# Patient Record
Sex: FEMALE | Race: BLACK OR AFRICAN AMERICAN | NOT HISPANIC OR LATINO | Employment: PART TIME | URBAN - METROPOLITAN AREA
[De-identification: names, ages, dates, MRNs, and addresses within clinical notes are randomized per-mention and may not be internally consistent; named-entity substitution may affect disease eponyms.]

---

## 2021-11-11 ENCOUNTER — HISTORICAL (OUTPATIENT)
Dept: RADIOLOGY | Facility: HOSPITAL | Age: 46
End: 2021-11-11

## 2022-01-01 ENCOUNTER — CLINICAL SUPPORT (OUTPATIENT)
Dept: BARIATRICS | Facility: HOSPITAL | Age: 47
End: 2022-01-01
Payer: COMMERCIAL

## 2022-01-01 ENCOUNTER — HOSPITAL ENCOUNTER (OUTPATIENT)
Dept: RADIOLOGY | Facility: HOSPITAL | Age: 47
Discharge: HOME | End: 2022-12-08
Attending: OBSTETRICS & GYNECOLOGY
Payer: COMMERCIAL

## 2022-01-01 VITALS — WEIGHT: 283.38 LBS | HEIGHT: 68 IN | BODY MASS INDEX: 42.95 KG/M2

## 2022-01-01 DIAGNOSIS — Z12.31 SCREENING MAMMOGRAM FOR BREAST CANCER: Primary | ICD-10-CM

## 2022-01-01 DIAGNOSIS — Z12.31 SCREENING MAMMOGRAM FOR BREAST CANCER: ICD-10-CM

## 2022-01-01 PROCEDURE — 77067 SCR MAMMO BI INCL CAD: CPT | Mod: TC

## 2022-01-01 PROCEDURE — 77063 BREAST TOMOSYNTHESIS BI: CPT | Mod: 26,,, | Performed by: RADIOLOGY

## 2022-01-01 PROCEDURE — 77063 MAMMO DIGITAL SCREENING BILAT WITH TOMO: ICD-10-PCS | Mod: 26,,, | Performed by: RADIOLOGY

## 2022-01-01 PROCEDURE — 77067 SCR MAMMO BI INCL CAD: CPT | Mod: 26,,, | Performed by: RADIOLOGY

## 2022-01-01 PROCEDURE — 77067 MAMMO DIGITAL SCREENING BILAT WITH TOMO: ICD-10-PCS | Mod: 26,,, | Performed by: RADIOLOGY

## 2022-11-03 NOTE — PROGRESS NOTES
BEHAVIOR MODIFICATION AND EXERCISE CONSULT    Non Surgical: [] Surgical: [x]      PERSONAL:     What initiated your interest in bariatric surgery? Struggled with my weight since I was 6 y.o.    Marital Status: []Single   [x]   []   []      Do you have children? 2    What is your highest level of education completed? 1.5 year of college    Who is your social or relational support? Daughter, spouse, dad.    Do you work? 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    Do you enjoy your work? Yes    PHYSICAL ACTIVITY:    Do you currently exercise: []Yes   [x]No    If so, please describe:    Have you experienced any injuries and/or restrictions that may limit your physical activity? []Yes   [x]No      If so, please describe:     How do you feel about exercise? I always did it all my life. I like walking on a track.    BEHAVIORS:    What behavior(s) would you like to change in order to be healthy? No sweets, late night eating, eat healthier and more often.    On a scale of 1-10 (1-extremely low, 10-extremely high), how motivated are you to change your behavior(s)? 10    Do you currently use any type of tobacco products (vape, dip, cigarettes, etc.) occasional cigar smoker.    If yes, on average, how many and/or how often do you use these products on a daily basis? 1-2x/month    How many hours of sleep do you average? 7-8    Rate your stress level on a scale of 1-10 (1-extremely low, 10-extremely high) 7    What is your biggest stressor? Daily life stuff    Is your appetite affected by stress? No    How do you cope and/or manage stress? Sleep, read    NOTES: A body composition was conducted and patient was educated. Measurements were conducted. Waist/hip ratio is 50.5/52 inches. Handouts: stress management.    Emeli David, [place], CPT, CHC

## 2022-11-03 NOTE — PROGRESS NOTES
NUTRITIONAL CONSULT    Initial assessment for sleeve gastrectomy   Non Surgical: []      PAST HISTORY:   Dieting attempts include Pt has tried lwo carb and diet pills and shots that have worked but then when she stops it all comes back, just is not consistent.   Highest weight: 321lb    CLINICAL DATA:  Height: 68in  Weight: 283.4lb lbs  IBW: 140 lbs  BMI: 43.09  Bariatric goal weight (125% EBW): 175 lbs  Patient's goal weight: 180 lbs    FAMILY HISTORY OF OBESITY:   []Yes    [x]No            WHAT SIDE OF THE FAMILY?   []Mother   []Father   []Sibling   []Child     []Extended    []Adopted       Goal for Bariatric Surgery: to improve health, to improve quality of life, to lose weight, and to prevent future medical conditions    NUTRITION & HEALTH HISTORY:  Greatest challenge: dining out frequency, starchy CHO, portion control, and high-fat diet    Current diet recall:   6am: not eating   10am: ritz crackers   12pm: mustard green rice and gravy and chicken   2pm: popcorn   830: [place] bag chips   Dark chocolate - most day of the week   Water is all she drink 100oz       Current Dietary Patterns:  Meal pattern: 3  Snackin / day Type:   Vegetables: Likes a variety. Eats daily.  Fruits: Likes a variety. Eats almost daily.  Beverages: water  Alcohol consumption:  on the weekends   Type:  Gin- with fruit juice   Dining out: Monthly. Mostly fast food, restaurants, and take-out.  Grocery shopping and food prep: [x]Self   []Spouse   []Other:   Emotional eating: []Yes   [x]No Which emotions if yes:  Nighttime snacking: []Yes   [x]No Middle of night: []Yes   []No  Before bedtime: [x]Yes   []No chocolate   Hx of disordered eating behaviors: []Yes   []No Anorexia: []Yes   []No Bulimia: []Yes   []No  Purging: []Yes   []No Binging: []Yes   []No  History of vitamin/mineral deficiencies:   [x]Yes   []No  Vit D  Vitamins / Minerals / Herbs:   Vit d, MVI. Vit c,     Food Allergies:   None       ASSESSMENT:  Patient reports attempts  at weight loss, only to regain lost weight.  Patient demonstrated knowledge of healthy eating behaviors and exercise patterns; admits to not eating healthy and not exercising at this point.  Patient states willingness to change lifestyle and make behavior modifications.  Expect good  compliance after surgery at this time.        ESTIMATED NEEDS:  Calories: 5415-1947  (20-25 kcal/kg adjusted BW/d)  Protein:  80-88  (1.0-1.1 g/kg adjusted BW/d)  Fluid:   1600  (20 mL/kg actual BW/d)    BARIATRIC DIET DISCUSSION:  Discussed diet after surgery and related to patients food record.  Reviewed diet progression before and after surgery.  Reinforced that surgery is not a magic bullet and importance of low fat foods and no snacking.  Answered all questions.    RECOMMENDATIONS:  Patient is a good candidate for bariatric surgery.      PLAN:  Continue to review Bariatric Nutrition Guidebook at home and call with any questions.  Work on Bariatric Nutrition Checklist.  1500-calorie diet.  5-6 meals per day.  Increase exercise.

## 2022-12-08 PROBLEM — E66.01 MORBID OBESITY WITH BMI OF 40.0-44.9, ADULT: Chronic | Status: ACTIVE | Noted: 2022-01-01

## 2022-12-08 PROBLEM — A04.8 H. PYLORI INFECTION: Status: ACTIVE | Noted: 2022-01-01

## 2022-12-16 NOTE — H&P
[facility]  General Surgery  History & Physical    Patient Name: Jasmina Ro  MRN: [id number]  Admission Date: 12/8/2022  Attending Physician: No att. providers found   Primary Care Provider: NASIMA HADDAD    Patient information was obtained from patient.     Subjective:     Chief Complaint/Reason for Admission: EGD    History of Present Illness:  Patient is a 47 y.o. female presents to \[facility]\""P for EGD. She is presenting today for EGD. No complaints. Preparing for bariatric surgery.   Previously treated for H Pylori.     Current Facility-Administered Medications on File Prior to Encounter   Medication    acetaminophen tablet 650 mg    labetaloL injection 10 mg    lactated ringers infusion    lactated ringers infusion    ondansetron injection 4 mg     Current Outpatient Medications on File Prior to Encounter   Medication Sig    atorvastatin (LIPITOR) 20 MG tablet Take 20 mg by mouth.    cholecalciferol, vitamin D3, 125 mcg (5,000 unit) Tab Take 5,000 Units by mouth.    glipizide-metformin (METAGLIP) 2.5-250 mg per tablet     hydroCHLOROthiazide (HYDRODIURIL) 25 MG tablet Take 25 mg by mouth.    lisinopriL 10 MG tablet Take 10 mg by mouth.    montelukast (SINGULAIR) 10 mg tablet Take 10 mg by mouth.    mv-ca-mn/iron/folic/K/herb 293 (ALIVE WOMEN'S ENERGY ORAL)     [DISCONTINUED] amLODIPine (NORVASC) 10 MG tablet 1 tablet.       Review of patient's allergies indicates:   Allergen Reactions    Norco [hydrocodone-acetaminophen] Itching       Past Medical History:   Diagnosis Date    ADD (attention deficit disorder)     Anxiety disorder, unspecified     Crohn disease     Depression     Diabetes mellitus     GERD (gastroesophageal reflux disease)     Hiatal hernia     HLD (hyperlipidemia)     Hypertension      Past Surgical History:   Procedure Laterality Date    COLONOSCOPY      ECTOPIC PREGNANCY SURGERY      ESOPHAGOGASTRODUODENOSCOPY      LAPAROSCOPIC  CHOLECYSTECTOMY      LAPAROSCOPIC OOPHORECTOMY      REDUCTION OF BOTH BREASTS      TONSILLECTOMY       Family History    None       Tobacco Use    Smoking status: Former     Types: Cigarettes    Smokeless tobacco: Never   Substance and Sexual Activity    Alcohol use: Yes     Comment: Very little    Drug use: Never    Sexual activity: Not on file     Review of Systems   Constitutional: Negative.    HENT: Negative.     Eyes: Negative.    Respiratory: Negative.     Cardiovascular: Negative.    Gastrointestinal: Negative.    Endocrine: Negative.    Genitourinary: Negative.    Musculoskeletal: Negative.    Skin: Negative.    Neurological: Negative.    Psychiatric/Behavioral: Negative.     Objective:     Vital Signs (Most Recent):    Vital Signs (24h Range):  BP: ()/()   Arterial Line BP: ()/()         There is no height or weight on file to calculate BMI.    Physical Exam  Vitals reviewed.   Constitutional:       General: She is not in acute distress.     Appearance: Normal appearance.   HENT:      Head: Normocephalic.   Cardiovascular:      Rate and Rhythm: Normal rate and regular rhythm.      Pulses: Normal pulses.   Pulmonary:      Effort: Pulmonary effort is normal.      Breath sounds: Normal breath sounds.   Abdominal:      General: Bowel sounds are normal.      Palpations: Abdomen is soft.      Tenderness: There is no abdominal tenderness.   Musculoskeletal:      Cervical back: Neck supple.      Right lower leg: No edema.      Left lower leg: No edema.   Skin:     General: Skin is warm and dry.   Neurological:      Mental Status: She is alert and oriented to person, place, and time.   Psychiatric:         Mood and Affect: Mood normal.         Behavior: Behavior normal.       Significant Labs:  I have reviewed all pertinent lab results within the past 24 hours.    Significant Diagnostics:  I have reviewed all pertinent imaging results/findings within the past 24 hours.    Assessment/Plan:     There are no hospital  problems to display for this patient.    VTE Risk Mitigation (From admission, onward)      None          Impression: Morbid Obesity   Recent H Pylori infection    Plan: EGD with ОЛЬГА test   Dr. Raphael examined patient, discussed recommendations, and obtained informed consent.   Maddie Crespo, ANP  General Surgery  [facility] [place] [facility]

## 2023-01-01 ENCOUNTER — PATIENT MESSAGE (OUTPATIENT)
Dept: ADMINISTRATIVE | Facility: OTHER | Age: 48
End: 2023-01-01
Payer: COMMERCIAL

## 2023-01-01 ENCOUNTER — CLINICAL SUPPORT (OUTPATIENT)
Dept: BARIATRICS | Facility: HOSPITAL | Age: 48
End: 2023-01-01

## 2023-01-01 ENCOUNTER — HOSPITAL ENCOUNTER (INPATIENT)
Facility: HOSPITAL | Age: 48
LOS: 1 days | Discharge: HOME | DRG: 620 | End: 2023-05-23
Attending: SURGERY | Admitting: SURGERY
Payer: COMMERCIAL

## 2023-01-01 ENCOUNTER — ANESTHESIA (OUTPATIENT)
Dept: SURGERY | Facility: HOSPITAL | Age: 48
DRG: 620 | End: 2023-01-01
Payer: COMMERCIAL

## 2023-01-01 ENCOUNTER — TELEPHONE (OUTPATIENT)
Dept: BARIATRICS | Facility: HOSPITAL | Age: 48
End: 2023-01-01
Payer: COMMERCIAL

## 2023-01-01 ENCOUNTER — ANESTHESIA EVENT (OUTPATIENT)
Dept: SURGERY | Facility: HOSPITAL | Age: 48
DRG: 620 | End: 2023-01-01
Payer: COMMERCIAL

## 2023-01-01 ENCOUNTER — TELEPHONE (OUTPATIENT)
Dept: SURGERY | Facility: CLINIC | Age: 48
End: 2023-01-01
Payer: COMMERCIAL

## 2023-01-01 ENCOUNTER — PATIENT OUTREACH (OUTPATIENT)
Dept: ADMINISTRATIVE | Facility: CLINIC | Age: 48
End: 2023-01-01
Payer: COMMERCIAL

## 2023-01-01 ENCOUNTER — PATIENT MESSAGE (OUTPATIENT)
Dept: ADMINISTRATIVE | Facility: CLINIC | Age: 48
End: 2023-01-01
Payer: COMMERCIAL

## 2023-01-01 VITALS
DIASTOLIC BLOOD PRESSURE: 82 MMHG | RESPIRATION RATE: 20 BRPM | HEIGHT: 69 IN | HEART RATE: 77 BPM | BODY MASS INDEX: 41.77 KG/M2 | SYSTOLIC BLOOD PRESSURE: 139 MMHG | WEIGHT: 282 LBS | TEMPERATURE: 100 F | OXYGEN SATURATION: 100 %

## 2023-01-01 VITALS — BODY MASS INDEX: 38.45 KG/M2 | WEIGHT: 256.63 LBS

## 2023-01-01 VITALS — BODY MASS INDEX: 43.35 KG/M2 | HEIGHT: 68 IN | WEIGHT: 286 LBS

## 2023-01-01 VITALS — BODY MASS INDEX: 39.4 KG/M2 | HEIGHT: 69 IN | WEIGHT: 266 LBS

## 2023-01-01 VITALS — WEIGHT: 282.13 LBS | BODY MASS INDEX: 42.76 KG/M2 | HEIGHT: 68 IN

## 2023-01-01 DIAGNOSIS — E66.01 MORBID OBESITY: ICD-10-CM

## 2023-01-01 DIAGNOSIS — Z13.0 SCREENING, ANEMIA, DEFICIENCY, IRON: ICD-10-CM

## 2023-01-01 DIAGNOSIS — Z91.89 ELECTROLYTE IMBALANCE RISK: ICD-10-CM

## 2023-01-01 DIAGNOSIS — Z13.21 ENCOUNTER FOR VITAMIN DEFICIENCY SCREENING: Primary | ICD-10-CM

## 2023-01-01 LAB
ABORH RETYPE: NORMAL
ANION GAP SERPL CALC-SCNC: 7 MEQ/L
B-HCG UR QL: NEGATIVE
BASOPHILS # BLD AUTO: 0.01 X10(3)/MCL
BASOPHILS NFR BLD AUTO: 0.1 %
BUN SERPL-MCNC: 5.4 MG/DL (ref 7–18.7)
CALCIUM SERPL-MCNC: 8.2 MG/DL (ref 8.4–10.2)
CHLORIDE SERPL-SCNC: 109 MMOL/L (ref 98–107)
CO2 SERPL-SCNC: 20 MMOL/L (ref 22–29)
CREAT SERPL-MCNC: 0.61 MG/DL (ref 0.55–1.02)
CREAT/UREA NIT SERPL: 9
CTP QC/QA: YES
EOSINOPHIL # BLD AUTO: 0.01 X10(3)/MCL (ref 0–0.9)
EOSINOPHIL NFR BLD AUTO: 0.1 %
ERYTHROCYTE [DISTWIDTH] IN BLOOD BY AUTOMATED COUNT: 13.1 % (ref 11.5–17)
GFR SERPLBLD CREATININE-BSD FMLA CKD-EPI: >60 MLS/MIN/1.73/M2
GLUCOSE SERPL-MCNC: 131 MG/DL (ref 74–100)
GROUP & RH: NORMAL
HCT VFR BLD AUTO: 35.5 % (ref 37–47)
HGB BLD-MCNC: 12 G/DL (ref 12–16)
IMM GRANULOCYTES # BLD AUTO: 0.03 X10(3)/MCL (ref 0–0.04)
IMM GRANULOCYTES NFR BLD AUTO: 0.3 %
INDIRECT COOMBS GEL: NORMAL
LYMPHOCYTES # BLD AUTO: 2.2 X10(3)/MCL (ref 0.6–4.6)
LYMPHOCYTES NFR BLD AUTO: 23.1 %
MCH RBC QN AUTO: 29.7 PG (ref 27–31)
MCHC RBC AUTO-ENTMCNC: 33.8 G/DL (ref 33–36)
MCV RBC AUTO: 87.9 FL (ref 80–94)
MONOCYTES # BLD AUTO: 0.7 X10(3)/MCL (ref 0.1–1.3)
MONOCYTES NFR BLD AUTO: 7.4 %
NEUTROPHILS # BLD AUTO: 6.57 X10(3)/MCL (ref 2.1–9.2)
NEUTROPHILS NFR BLD AUTO: 69 %
NRBC BLD AUTO-RTO: 0 %
PLATELET # BLD AUTO: 351 X10(3)/MCL (ref 130–400)
PMV BLD AUTO: 8.7 FL (ref 7.4–10.4)
POCT GLUCOSE: 110 MG/DL (ref 70–110)
POCT GLUCOSE: 117 MG/DL (ref 70–110)
POCT GLUCOSE: 149 MG/DL (ref 70–110)
POCT GLUCOSE: 153 MG/DL (ref 70–110)
POTASSIUM SERPL-SCNC: 3.7 MMOL/L (ref 3.5–5.1)
PSYCHE PATHOLOGY RESULT: NORMAL
RBC # BLD AUTO: 4.04 X10(6)/MCL (ref 4.2–5.4)
SODIUM SERPL-SCNC: 136 MMOL/L (ref 136–145)
SPECIMEN OUTDATE: NORMAL
WBC # SPEC AUTO: 9.52 X10(3)/MCL (ref 4.5–11.5)

## 2023-01-01 PROCEDURE — 81025 URINE PREGNANCY TEST: CPT | Performed by: SURGERY

## 2023-01-01 PROCEDURE — C9290 INJ, BUPIVACAINE LIPOSOME: HCPCS | Performed by: SURGERY

## 2023-01-01 PROCEDURE — 25000003 PHARM REV CODE 250: Performed by: NURSE PRACTITIONER

## 2023-01-01 PROCEDURE — 37000009 HC ANESTHESIA EA ADD 15 MINS: Performed by: SURGERY

## 2023-01-01 PROCEDURE — 85025 COMPLETE CBC W/AUTO DIFF WBC: CPT | Performed by: NURSE PRACTITIONER

## 2023-01-01 PROCEDURE — 71000033 HC RECOVERY, INTIAL HOUR: Performed by: SURGERY

## 2023-01-01 PROCEDURE — D9220A PRA ANESTHESIA: ICD-10-PCS | Mod: ANES,,, | Performed by: ANESTHESIOLOGY

## 2023-01-01 PROCEDURE — 63600175 PHARM REV CODE 636 W HCPCS: Performed by: NURSE PRACTITIONER

## 2023-01-01 PROCEDURE — 36000710: Performed by: SURGERY

## 2023-01-01 PROCEDURE — 25000003 PHARM REV CODE 250: Performed by: NURSE ANESTHETIST, CERTIFIED REGISTERED

## 2023-01-01 PROCEDURE — 25000003 PHARM REV CODE 250: Performed by: SURGERY

## 2023-01-01 PROCEDURE — 27201423 OPTIME MED/SURG SUP & DEVICES STERILE SUPPLY: Performed by: SURGERY

## 2023-01-01 PROCEDURE — 63600175 PHARM REV CODE 636 W HCPCS: Performed by: SURGERY

## 2023-01-01 PROCEDURE — D9220A PRA ANESTHESIA: Mod: ANES,,, | Performed by: ANESTHESIOLOGY

## 2023-01-01 PROCEDURE — 71000015 HC POSTOP RECOV 1ST HR: Performed by: SURGERY

## 2023-01-01 PROCEDURE — 63600175 PHARM REV CODE 636 W HCPCS: Performed by: ANESTHESIOLOGY

## 2023-01-01 PROCEDURE — 63600175 PHARM REV CODE 636 W HCPCS

## 2023-01-01 PROCEDURE — 11000001 HC ACUTE MED/SURG PRIVATE ROOM

## 2023-01-01 PROCEDURE — 63600175 PHARM REV CODE 636 W HCPCS: Performed by: NURSE ANESTHETIST, CERTIFIED REGISTERED

## 2023-01-01 PROCEDURE — 80048 BASIC METABOLIC PNL TOTAL CA: CPT | Performed by: NURSE PRACTITIONER

## 2023-01-01 PROCEDURE — 86900 BLOOD TYPING SEROLOGIC ABO: CPT | Performed by: NURSE PRACTITIONER

## 2023-01-01 PROCEDURE — 82962 GLUCOSE BLOOD TEST: CPT | Performed by: SURGERY

## 2023-01-01 PROCEDURE — 99900035 HC TECH TIME PER 15 MIN (STAT)

## 2023-01-01 PROCEDURE — D9220A PRA ANESTHESIA: ICD-10-PCS | Mod: CRNA,,, | Performed by: NURSE ANESTHETIST, CERTIFIED REGISTERED

## 2023-01-01 PROCEDURE — 71000039 HC RECOVERY, EACH ADD'L HOUR: Performed by: SURGERY

## 2023-01-01 PROCEDURE — 36000711: Performed by: SURGERY

## 2023-01-01 PROCEDURE — D9220A PRA ANESTHESIA: Mod: CRNA,,, | Performed by: NURSE ANESTHETIST, CERTIFIED REGISTERED

## 2023-01-01 PROCEDURE — 37000008 HC ANESTHESIA 1ST 15 MINUTES: Performed by: SURGERY

## 2023-01-01 RX ORDER — ONDANSETRON 4 MG/1
4 TABLET, ORALLY DISINTEGRATING ORAL EVERY 6 HOURS PRN
Qty: 20 TABLET | Refills: 0 | Status: SHIPPED | OUTPATIENT
Start: 2023-01-01

## 2023-01-01 RX ORDER — ACETAMINOPHEN 500 MG
1000 TABLET ORAL
Status: COMPLETED | OUTPATIENT
Start: 2023-01-01 | End: 2023-01-01

## 2023-01-01 RX ORDER — PROCHLORPERAZINE EDISYLATE 5 MG/ML
5 INJECTION INTRAMUSCULAR; INTRAVENOUS EVERY 30 MIN PRN
Status: CANCELLED | OUTPATIENT
Start: 2023-01-01

## 2023-01-01 RX ORDER — ONDANSETRON 2 MG/ML
4 INJECTION INTRAMUSCULAR; INTRAVENOUS DAILY PRN
Status: CANCELLED | OUTPATIENT
Start: 2023-01-01

## 2023-01-01 RX ORDER — MEPERIDINE HYDROCHLORIDE 25 MG/ML
25 INJECTION INTRAMUSCULAR; INTRAVENOUS; SUBCUTANEOUS ONCE
Status: COMPLETED | OUTPATIENT
Start: 2023-01-01 | End: 2023-01-01

## 2023-01-01 RX ORDER — ACETAMINOPHEN 10 MG/ML
INJECTION, SOLUTION INTRAVENOUS
Status: COMPLETED
Start: 2023-01-01 | End: 2023-01-01

## 2023-01-01 RX ORDER — HYDROMORPHONE HYDROCHLORIDE 2 MG/ML
0.2 INJECTION, SOLUTION INTRAMUSCULAR; INTRAVENOUS; SUBCUTANEOUS EVERY 5 MIN PRN
Status: CANCELLED | OUTPATIENT
Start: 2023-01-01

## 2023-01-01 RX ORDER — LISINOPRIL 10 MG/1
10 TABLET ORAL DAILY
Status: DISCONTINUED | OUTPATIENT
Start: 2023-01-01 | End: 2023-01-01 | Stop reason: HOSPADM

## 2023-01-01 RX ORDER — BUPIVACAINE HYDROCHLORIDE 5 MG/ML
INJECTION, SOLUTION EPIDURAL; INTRACAUDAL
Status: DISCONTINUED | OUTPATIENT
Start: 2023-01-01 | End: 2023-01-01 | Stop reason: HOSPADM

## 2023-01-01 RX ORDER — FENTANYL CITRATE 50 UG/ML
INJECTION, SOLUTION INTRAMUSCULAR; INTRAVENOUS
Status: DISCONTINUED | OUTPATIENT
Start: 2023-01-01 | End: 2023-01-01

## 2023-01-01 RX ORDER — CEFAZOLIN SODIUM 2 G/50ML
2 SOLUTION INTRAVENOUS
Status: COMPLETED | OUTPATIENT
Start: 2023-01-01 | End: 2023-01-01

## 2023-01-01 RX ORDER — IBUPROFEN 200 MG
16 TABLET ORAL
Status: DISCONTINUED | OUTPATIENT
Start: 2023-01-01 | End: 2023-01-01 | Stop reason: HOSPADM

## 2023-01-01 RX ORDER — ROCURONIUM BROMIDE 10 MG/ML
INJECTION, SOLUTION INTRAVENOUS
Status: DISCONTINUED | OUTPATIENT
Start: 2023-01-01 | End: 2023-01-01

## 2023-01-01 RX ORDER — MEPERIDINE HYDROCHLORIDE 25 MG/ML
12.5 INJECTION INTRAMUSCULAR; INTRAVENOUS; SUBCUTANEOUS EVERY 10 MIN PRN
Status: CANCELLED | OUTPATIENT
Start: 2023-01-01 | End: 2023-01-01

## 2023-01-01 RX ORDER — ONDANSETRON 2 MG/ML
INJECTION INTRAMUSCULAR; INTRAVENOUS
Status: DISCONTINUED | OUTPATIENT
Start: 2023-01-01 | End: 2023-01-01

## 2023-01-01 RX ORDER — HYDROMORPHONE HYDROCHLORIDE 2 MG/ML
0.2 INJECTION, SOLUTION INTRAMUSCULAR; INTRAVENOUS; SUBCUTANEOUS EVERY 5 MIN PRN
Status: DISCONTINUED | OUTPATIENT
Start: 2023-01-01 | End: 2023-01-01 | Stop reason: HOSPADM

## 2023-01-01 RX ORDER — ACETAMINOPHEN 500 MG
1000 TABLET ORAL EVERY 8 HOURS
Qty: 18 TABLET | Refills: 0 | Status: SHIPPED | OUTPATIENT
Start: 2023-01-01 | End: 2023-01-01

## 2023-01-01 RX ORDER — LABETALOL HYDROCHLORIDE 5 MG/ML
10 INJECTION, SOLUTION INTRAVENOUS
Status: DISCONTINUED | OUTPATIENT
Start: 2023-01-01 | End: 2023-01-01 | Stop reason: HOSPADM

## 2023-01-01 RX ORDER — MONTELUKAST SODIUM 10 MG/1
10 TABLET ORAL DAILY
Status: DISCONTINUED | OUTPATIENT
Start: 2023-01-01 | End: 2023-01-01 | Stop reason: HOSPADM

## 2023-01-01 RX ORDER — GLUCAGON 1 MG
1 KIT INJECTION
Status: DISCONTINUED | OUTPATIENT
Start: 2023-01-01 | End: 2023-01-01 | Stop reason: HOSPADM

## 2023-01-01 RX ORDER — ACETAMINOPHEN 10 MG/ML
1000 INJECTION, SOLUTION INTRAVENOUS EVERY 8 HOURS
Status: DISPENSED | OUTPATIENT
Start: 2023-01-01 | End: 2023-01-01

## 2023-01-01 RX ORDER — PROCHLORPERAZINE EDISYLATE 5 MG/ML
5 INJECTION INTRAMUSCULAR; INTRAVENOUS EVERY 6 HOURS PRN
Status: DISCONTINUED | OUTPATIENT
Start: 2023-01-01 | End: 2023-01-01 | Stop reason: HOSPADM

## 2023-01-01 RX ORDER — PANTOPRAZOLE SODIUM 40 MG/1
40 TABLET, DELAYED RELEASE ORAL DAILY
Qty: 30 TABLET | Refills: 0 | Status: SHIPPED | OUTPATIENT
Start: 2023-01-01 | End: 2023-01-01

## 2023-01-01 RX ORDER — GLYCOPYRROLATE 0.2 MG/ML
INJECTION INTRAMUSCULAR; INTRAVENOUS
Status: DISCONTINUED | OUTPATIENT
Start: 2023-01-01 | End: 2023-01-01

## 2023-01-01 RX ORDER — MIDAZOLAM HYDROCHLORIDE 1 MG/ML
INJECTION INTRAMUSCULAR; INTRAVENOUS
Status: DISCONTINUED | OUTPATIENT
Start: 2023-01-01 | End: 2023-01-01

## 2023-01-01 RX ORDER — CELECOXIB 200 MG/1
200 CAPSULE ORAL ONCE
Status: COMPLETED | OUTPATIENT
Start: 2023-01-01 | End: 2023-01-01

## 2023-01-01 RX ORDER — HYDROMORPHONE HYDROCHLORIDE 2 MG/ML
INJECTION, SOLUTION INTRAMUSCULAR; INTRAVENOUS; SUBCUTANEOUS
Status: DISCONTINUED
Start: 2023-01-01 | End: 2023-01-01 | Stop reason: WASHOUT

## 2023-01-01 RX ORDER — ACETAMINOPHEN 500 MG
1000 TABLET ORAL EVERY 8 HOURS
Status: DISCONTINUED | OUTPATIENT
Start: 2023-01-01 | End: 2023-01-01

## 2023-01-01 RX ORDER — ONDANSETRON 2 MG/ML
4 INJECTION INTRAMUSCULAR; INTRAVENOUS EVERY 4 HOURS PRN
Status: DISCONTINUED | OUTPATIENT
Start: 2023-01-01 | End: 2023-01-01 | Stop reason: HOSPADM

## 2023-01-01 RX ORDER — PROMETHAZINE HYDROCHLORIDE 12.5 MG/1
12.5 TABLET ORAL EVERY 6 HOURS PRN
Qty: 20 TABLET | Refills: 0 | Status: SHIPPED | OUTPATIENT
Start: 2023-01-01

## 2023-01-01 RX ORDER — IBUPROFEN 200 MG
24 TABLET ORAL
Status: DISCONTINUED | OUTPATIENT
Start: 2023-01-01 | End: 2023-01-01 | Stop reason: HOSPADM

## 2023-01-01 RX ORDER — HYDRALAZINE HYDROCHLORIDE 20 MG/ML
10 INJECTION INTRAMUSCULAR; INTRAVENOUS EVERY 6 HOURS PRN
Status: DISCONTINUED | OUTPATIENT
Start: 2023-01-01 | End: 2023-01-01 | Stop reason: HOSPADM

## 2023-01-01 RX ORDER — KETOROLAC TROMETHAMINE 10 MG/1
10 TABLET, FILM COATED ORAL EVERY 6 HOURS
Qty: 12 TABLET | Refills: 0 | Status: SHIPPED | OUTPATIENT
Start: 2023-01-01 | End: 2023-01-01

## 2023-01-01 RX ORDER — KETOROLAC TROMETHAMINE 30 MG/ML
30 INJECTION, SOLUTION INTRAMUSCULAR; INTRAVENOUS EVERY 6 HOURS
Status: DISCONTINUED | OUTPATIENT
Start: 2023-01-01 | End: 2023-01-01 | Stop reason: HOSPADM

## 2023-01-01 RX ORDER — DEXAMETHASONE SODIUM PHOSPHATE 4 MG/ML
INJECTION, SOLUTION INTRA-ARTICULAR; INTRALESIONAL; INTRAMUSCULAR; INTRAVENOUS; SOFT TISSUE
Status: DISCONTINUED | OUTPATIENT
Start: 2023-01-01 | End: 2023-01-01

## 2023-01-01 RX ORDER — METHOCARBAMOL 100 MG/ML
INJECTION, SOLUTION INTRAMUSCULAR; INTRAVENOUS
Status: DISPENSED
Start: 2023-01-01 | End: 2023-01-01

## 2023-01-01 RX ORDER — ENOXAPARIN SODIUM 100 MG/ML
40 INJECTION SUBCUTANEOUS DAILY
Qty: 5.6 ML | Refills: 0
Start: 2023-01-01 | End: 2023-01-01

## 2023-01-01 RX ORDER — ENOXAPARIN SODIUM 100 MG/ML
40 INJECTION SUBCUTANEOUS DAILY
Status: DISCONTINUED | OUTPATIENT
Start: 2023-01-01 | End: 2023-01-01 | Stop reason: HOSPADM

## 2023-01-01 RX ORDER — LIDOCAINE HYDROCHLORIDE 10 MG/ML
1 INJECTION, SOLUTION EPIDURAL; INFILTRATION; INTRACAUDAL; PERINEURAL ONCE
Status: CANCELLED | OUTPATIENT
Start: 2023-01-01 | End: 2023-01-01

## 2023-01-01 RX ORDER — PROPOFOL 10 MG/ML
VIAL (ML) INTRAVENOUS
Status: DISCONTINUED | OUTPATIENT
Start: 2023-01-01 | End: 2023-01-01

## 2023-01-01 RX ORDER — MEPERIDINE HYDROCHLORIDE 25 MG/ML
INJECTION INTRAMUSCULAR; INTRAVENOUS; SUBCUTANEOUS
Status: COMPLETED
Start: 2023-01-01 | End: 2023-01-01

## 2023-01-01 RX ORDER — TRAMADOL HYDROCHLORIDE 50 MG/1
100 TABLET ORAL EVERY 6 HOURS PRN
Status: DISCONTINUED | OUTPATIENT
Start: 2023-01-01 | End: 2023-01-01 | Stop reason: HOSPADM

## 2023-01-01 RX ORDER — MIDAZOLAM HYDROCHLORIDE 1 MG/ML
2 INJECTION INTRAMUSCULAR; INTRAVENOUS
Status: CANCELLED | OUTPATIENT
Start: 2023-01-01 | End: 2023-01-01

## 2023-01-01 RX ORDER — ACETAMINOPHEN 500 MG
1000 TABLET ORAL EVERY 8 HOURS
Status: DISCONTINUED | OUTPATIENT
Start: 2023-01-01 | End: 2023-01-01 | Stop reason: HOSPADM

## 2023-01-01 RX ORDER — SODIUM CHLORIDE, SODIUM LACTATE, POTASSIUM CHLORIDE, CALCIUM CHLORIDE 600; 310; 30; 20 MG/100ML; MG/100ML; MG/100ML; MG/100ML
INJECTION, SOLUTION INTRAVENOUS CONTINUOUS
Status: ACTIVE | OUTPATIENT
Start: 2023-01-01 | End: 2023-01-01

## 2023-01-01 RX ORDER — SODIUM CHLORIDE, SODIUM GLUCONATE, SODIUM ACETATE, POTASSIUM CHLORIDE AND MAGNESIUM CHLORIDE 30; 37; 368; 526; 502 MG/100ML; MG/100ML; MG/100ML; MG/100ML; MG/100ML
INJECTION, SOLUTION INTRAVENOUS CONTINUOUS
Status: CANCELLED | OUTPATIENT
Start: 2023-01-01 | End: 2023-01-01

## 2023-01-01 RX ORDER — CLONIDINE 0.1 MG/24H
1 PATCH, EXTENDED RELEASE TRANSDERMAL ONCE AS NEEDED
Status: DISCONTINUED | OUTPATIENT
Start: 2023-01-01 | End: 2023-01-01 | Stop reason: HOSPADM

## 2023-01-01 RX ORDER — ONDANSETRON 4 MG/1
4 TABLET, ORALLY DISINTEGRATING ORAL EVERY 6 HOURS PRN
Status: DISCONTINUED | OUTPATIENT
Start: 2023-01-01 | End: 2023-01-01 | Stop reason: HOSPADM

## 2023-01-01 RX ORDER — SODIUM CHLORIDE, SODIUM LACTATE, POTASSIUM CHLORIDE, CALCIUM CHLORIDE 600; 310; 30; 20 MG/100ML; MG/100ML; MG/100ML; MG/100ML
INJECTION, SOLUTION INTRAVENOUS CONTINUOUS
Status: DISCONTINUED | OUTPATIENT
Start: 2023-01-01 | End: 2023-01-01 | Stop reason: HOSPADM

## 2023-01-01 RX ORDER — PROMETHAZINE HYDROCHLORIDE 12.5 MG/1
12.5 TABLET ORAL EVERY 6 HOURS PRN
Status: DISCONTINUED | OUTPATIENT
Start: 2023-01-01 | End: 2023-01-01 | Stop reason: HOSPADM

## 2023-01-01 RX ORDER — RISANKIZUMAB-RZAA 60 MG/ML
INJECTION INTRAVENOUS
COMMUNITY

## 2023-01-01 RX ORDER — INSULIN ASPART 100 [IU]/ML
1-10 INJECTION, SOLUTION INTRAVENOUS; SUBCUTANEOUS
Status: DISCONTINUED | OUTPATIENT
Start: 2023-01-01 | End: 2023-01-01 | Stop reason: HOSPADM

## 2023-01-01 RX ORDER — ENOXAPARIN SODIUM 100 MG/ML
40 INJECTION SUBCUTANEOUS
Status: COMPLETED | OUTPATIENT
Start: 2023-01-01 | End: 2023-01-01

## 2023-01-01 RX ORDER — DIPHENHYDRAMINE HYDROCHLORIDE 50 MG/ML
25 INJECTION INTRAMUSCULAR; INTRAVENOUS EVERY 6 HOURS PRN
Status: CANCELLED | OUTPATIENT
Start: 2023-01-01

## 2023-01-01 RX ORDER — GABAPENTIN 300 MG/1
600 CAPSULE ORAL ONCE
Status: COMPLETED | OUTPATIENT
Start: 2023-01-01 | End: 2023-01-01

## 2023-01-01 RX ORDER — SODIUM CITRATE AND CITRIC ACID MONOHYDRATE 334; 500 MG/5ML; MG/5ML
30 SOLUTION ORAL ONCE
Status: CANCELLED | OUTPATIENT
Start: 2023-01-01 | End: 2023-01-01

## 2023-01-01 RX ORDER — ONDANSETRON 4 MG/1
4 TABLET, ORALLY DISINTEGRATING ORAL ONCE
Status: COMPLETED | OUTPATIENT
Start: 2023-01-01 | End: 2023-01-01

## 2023-01-01 RX ORDER — SODIUM CHLORIDE 0.9 % (FLUSH) 0.9 %
10 SYRINGE (ML) INJECTION
Status: DISCONTINUED | OUTPATIENT
Start: 2023-01-01 | End: 2023-01-01 | Stop reason: HOSPADM

## 2023-01-01 RX ORDER — PANTOPRAZOLE SODIUM 40 MG/1
40 TABLET, DELAYED RELEASE ORAL DAILY
Status: DISCONTINUED | OUTPATIENT
Start: 2023-01-01 | End: 2023-01-01 | Stop reason: HOSPADM

## 2023-01-01 RX ORDER — LIDOCAINE HYDROCHLORIDE 20 MG/ML
INJECTION, SOLUTION EPIDURAL; INFILTRATION; INTRACAUDAL; PERINEURAL
Status: DISCONTINUED | OUTPATIENT
Start: 2023-01-01 | End: 2023-01-01

## 2023-01-01 RX ADMIN — GLYCOPYRROLATE 0.2 MG: 0.2 INJECTION INTRAMUSCULAR; INTRAVENOUS at 12:05

## 2023-01-01 RX ADMIN — SODIUM CHLORIDE, POTASSIUM CHLORIDE, SODIUM LACTATE AND CALCIUM CHLORIDE: 600; 310; 30; 20 INJECTION, SOLUTION INTRAVENOUS at 12:05

## 2023-01-01 RX ADMIN — SODIUM CHLORIDE, POTASSIUM CHLORIDE, SODIUM LACTATE AND CALCIUM CHLORIDE: 600; 310; 30; 20 INJECTION, SOLUTION INTRAVENOUS at 04:05

## 2023-01-01 RX ADMIN — LIDOCAINE HYDROCHLORIDE 80 MG: 20 INJECTION, SOLUTION EPIDURAL; INFILTRATION; INTRACAUDAL; PERINEURAL at 12:05

## 2023-01-01 RX ADMIN — DEXAMETHASONE SODIUM PHOSPHATE 4 MG: 4 INJECTION, SOLUTION INTRA-ARTICULAR; INTRALESIONAL; INTRAMUSCULAR; INTRAVENOUS; SOFT TISSUE at 12:05

## 2023-01-01 RX ADMIN — ACETAMINOPHEN 1000 MG: 10 INJECTION, SOLUTION INTRAVENOUS at 02:05

## 2023-01-01 RX ADMIN — ACETAMINOPHEN 1000 MG: 500 TABLET, FILM COATED ORAL at 11:05

## 2023-01-01 RX ADMIN — CELECOXIB 200 MG: 200 CAPSULE ORAL at 08:05

## 2023-01-01 RX ADMIN — SUGAMMADEX 200 MG: 100 INJECTION, SOLUTION INTRAVENOUS at 01:05

## 2023-01-01 RX ADMIN — KETOROLAC TROMETHAMINE 30 MG: 30 INJECTION, SOLUTION INTRAMUSCULAR; INTRAVENOUS at 05:05

## 2023-01-01 RX ADMIN — FENTANYL CITRATE 50 MCG: 50 INJECTION, SOLUTION INTRAMUSCULAR; INTRAVENOUS at 01:05

## 2023-01-01 RX ADMIN — LABETALOL HYDROCHLORIDE 10 MG: 5 INJECTION INTRAVENOUS at 09:05

## 2023-01-01 RX ADMIN — LORAZEPAM 0.5 MG: 2 INJECTION INTRAMUSCULAR; INTRAVENOUS at 12:05

## 2023-01-01 RX ADMIN — MEPERIDINE HYDROCHLORIDE 25 MG: 25 INJECTION INTRAMUSCULAR; INTRAVENOUS; SUBCUTANEOUS at 02:05

## 2023-01-01 RX ADMIN — HYDRALAZINE HYDROCHLORIDE 10 MG: 20 INJECTION INTRAMUSCULAR; INTRAVENOUS at 02:05

## 2023-01-01 RX ADMIN — FENTANYL CITRATE 50 MCG: 50 INJECTION, SOLUTION INTRAMUSCULAR; INTRAVENOUS at 12:05

## 2023-01-01 RX ADMIN — CEFAZOLIN SODIUM 2 G: 2 SOLUTION INTRAVENOUS at 12:05

## 2023-01-01 RX ADMIN — ONDANSETRON 4 MG: 4 TABLET, ORALLY DISINTEGRATING ORAL at 09:05

## 2023-01-01 RX ADMIN — ACETAMINOPHEN 1000 MG: 10 INJECTION, SOLUTION INTRAVENOUS at 09:05

## 2023-01-01 RX ADMIN — MIDAZOLAM HYDROCHLORIDE 2 MG: 1 INJECTION, SOLUTION INTRAMUSCULAR; INTRAVENOUS at 12:05

## 2023-01-01 RX ADMIN — ACETAMINOPHEN 1000 MG: 500 TABLET ORAL at 09:05

## 2023-01-01 RX ADMIN — KETOROLAC TROMETHAMINE 30 MG: 30 INJECTION, SOLUTION INTRAMUSCULAR; INTRAVENOUS at 06:05

## 2023-01-01 RX ADMIN — HYDROMORPHONE HYDROCHLORIDE 0.2 MG: 2 INJECTION, SOLUTION INTRAMUSCULAR; INTRAVENOUS; SUBCUTANEOUS at 02:05

## 2023-01-01 RX ADMIN — SODIUM CHLORIDE, SODIUM GLUCONATE, SODIUM ACETATE, POTASSIUM CHLORIDE AND MAGNESIUM CHLORIDE: 526; 502; 368; 37; 30 INJECTION, SOLUTION INTRAVENOUS at 12:05

## 2023-01-01 RX ADMIN — ONDANSETRON 4 MG: 2 INJECTION INTRAMUSCULAR; INTRAVENOUS at 01:05

## 2023-01-01 RX ADMIN — FENTANYL CITRATE 25 MCG: 50 INJECTION, SOLUTION INTRAMUSCULAR; INTRAVENOUS at 01:05

## 2023-01-01 RX ADMIN — ROCURONIUM BROMIDE 60 MG: 10 SOLUTION INTRAVENOUS at 12:05

## 2023-01-01 RX ADMIN — ENOXAPARIN SODIUM 40 MG: 40 INJECTION SUBCUTANEOUS at 09:05

## 2023-01-01 RX ADMIN — KETOROLAC TROMETHAMINE 30 MG: 30 INJECTION, SOLUTION INTRAMUSCULAR; INTRAVENOUS at 12:05

## 2023-01-01 RX ADMIN — GABAPENTIN 600 MG: 300 CAPSULE ORAL at 09:05

## 2023-01-01 RX ADMIN — PROPOFOL 200 MG: 10 INJECTION, EMULSION INTRAVENOUS at 12:05

## 2023-01-01 RX ADMIN — ONDANSETRON 4 MG: 2 INJECTION INTRAMUSCULAR; INTRAVENOUS at 03:05

## 2023-01-01 RX ADMIN — METHOCARBAMOL 1000 MG: 100 INJECTION INTRAMUSCULAR; INTRAVENOUS at 01:05

## 2023-01-01 RX ADMIN — LISINOPRIL 10 MG: 10 TABLET ORAL at 09:05

## 2023-01-01 RX ADMIN — MONTELUKAST 10 MG: 10 TABLET, FILM COATED ORAL at 09:05

## 2023-01-01 RX ADMIN — PANTOPRAZOLE SODIUM 40 MG: 40 TABLET, DELAYED RELEASE ORAL at 09:05

## 2023-04-25 NOTE — PROGRESS NOTES
Date of education: 04/25/2023  Pt education type: [x]Pre op  []Post op  Surgery date: 05/22/2023  Type of surgery: sleeve gastrectomy     Education was provided on:   [x]Importance of protein and vitamin protocol  [x]Importance of drinking  fl oz/day of non carbonated sugar free non caffeinated beverages  [x]Importance of following dietary protocol  []Importance of early ambulation postop   []Use of incentive spirometer 10 times an hour while awake  []Non opiod pain management post op   []Discontinuing use of meds containing aspirin, ibuprofen, NSAIDs, post op  []Signs and symptoms of immediate and long term complications post-op  []Prevention and signs and symptoms of blood clots   []Prevention and signs of infection  []Reviewed medication regimen  []Importance of adhering to behavioral changes  []Importance of following exercise protocol      Barriers to learning:  [x]None evident  []Acuity of illness  []Cognitive defects  []Cultural barriers  []Desire/Motivation  []Difficulty concentrating  []Emotional state  []Financial concerns  []Hearing deficit  []Language barrier  []Literacy  []Memory problems  []Vision impairment     Teaching methods:  []Demonstration  [x]Explanation  [x]Printed materials  [x]Teach back  []Virtual/web based    Expected Compliance:  [x]Good  []Fair  []Poor    Additional Notes:

## 2023-04-25 NOTE — PROGRESS NOTES
Date of education: 4/25/23  Pt education type: [x]Pre op  []Post op  Surgery date: 5/22/23  Type of surgery: sleeve gastrectomy     Education was provided on:   []Importance of protein and vitamin protocol  []Importance of drinking  fl oz/day of non carbonated sugar free non caffeinated beverages  []Importance of following dietary protocol  []Importance of early ambulation postop   []Use of incentive spirometer 10 times an hour while awake  []Non opiod pain management post op   []Discontinuing use of meds containing aspirin, ibuprofen, NSAIDs, post op  []Signs and symptoms of immediate and long term complications post-op  []Prevention and signs and symptoms of blood clots   []Prevention and signs of infection  []Reviewed medication regimen  [x]Importance of adhering to behavioral changes  [x]Importance of following exercise protocol      Barriers to learning:  [x]None evident  []Acuity of illness  []Cognitive defects  []Cultural barriers  []Desire/Motivation  []Difficulty concentrating  []Emotional state  []Financial concerns  []Hearing deficit  []Language barrier  []Literacy  []Memory problems  []Vision impairment     Teaching methods:  [x]Demonstration  []Explanation  []Printed materials  []Teach back  []Virtual/web based    Expected Compliance:  [x]Good  []Fair  []Poor    Additional Notes: A body composition was conducted.    Emeli David, [place], CPT, CHC

## 2023-05-02 NOTE — PROGRESS NOTES
Date of education: 04/25/2023  Pt education type: [x]Pre op  []Post op  Surgery date: 05/22/2023  Type of surgery: sleeve gastrectomy     Education was provided on:   []Importance of protein and vitamin protocol  []Importance of drinking  fl oz/day of non carbonated sugar free non caffeinated beverages  []Importance of following dietary protocol  [x]Importance of early ambulation postop   [x]Use of incentive spirometer 10 times an hour while awake  [x]Non opiod pain management post op   [x]Discontinuing use of meds containing aspirin, ibuprofen, NSAIDs, post op  [x]Signs and symptoms of immediate and long term complications post-op  [x]Prevention and signs and symptoms of blood clots   [x]Prevention and signs of infection  [x]Reviewed medication regimen  []Importance of adhering to behavioral changes  []Importance of following exercise protocol      Barriers to learning:  [x]None evident  []Acuity of illness  []Cognitive defects  []Cultural barriers  []Desire/Motivation  []Difficulty concentrating  []Emotional state  []Financial concerns  []Hearing deficit  []Language barrier  []Literacy  []Memory problems  []Vision impairment     Teaching methods:  []Demonstration  [x]Explanation  [x]Printed materials  [x]Teach back  []Virtual/web based    Expected Compliance:  [x]Good  []Fair  []Poor

## 2023-05-09 NOTE — PROGRESS NOTES
Pt attended pre-op visit with bariatric team and completed questionnaire. Pre- and immediate post-op guidelines were reviewed. Pt confirmed knowledge and expressed understanding.     Weight: 286#  Weight loss since pre-op class:  -1#

## 2023-05-09 NOTE — PROGRESS NOTES
Patient attended preop education visit with team. Patient missed 2 on questions 13-18. Corrections were discussed. Patient lose 1 lb. No issues noted.     [place] Karimi, CPT, CHC

## 2023-05-19 PROBLEM — A04.8 H. PYLORI INFECTION: Status: RESOLVED | Noted: 2022-01-01 | Resolved: 2023-01-01

## 2023-05-22 NOTE — ANESTHESIA PROCEDURE NOTES
Intubation    Date/Time: 5/22/2023 12:26 PM  Performed by: Amy Alex CRNA  Authorized by: Ally Fulton MD     Intubation:     Induction:  Intravenous    Intubated:  Postinduction    Mask Ventilation:  Easy mask    Attempts:  1    Attempted By:  CRNA    Method of Intubation:  Direct    Blade:  Villegas 2    Laryngeal View Grade: Grade IIA - cords partially seen      Difficult Airway Encountered?: No      Complications:  None    Airway Device:  Oral endotracheal tube    Airway Device Size:  7.0    Style/Cuff Inflation:  Cuffed (inflated to minimal occlusive pressure)    Tube secured:  22    Secured at:  The lips    Placement Verified By:  Capnometry    Complicating Factors:  None    Findings Post-Intubation:  BS equal bilateral and atraumatic/condition of teeth unchanged

## 2023-05-22 NOTE — TRANSFER OF CARE
Anesthesia Transfer of Care Note    Patient: Jasmina Ro    Procedure(s) Performed: Procedure(s) (LRB):  GASTRECTOMY, SLEEVE, LAPAROSCOPIC (N/A)    Patient location: PACU    Anesthesia Type: general    Transport from OR: Transported from OR on room air with adequate spontaneous ventilation    Post pain: adequate analgesia    Post assessment: no apparent anesthetic complications and tolerated procedure well    Post vital signs: stable    Level of consciousness: sedated and responds to stimulation    Nausea/Vomiting: no nausea/vomiting    Complications: none    Transfer of care protocol was followed

## 2023-05-22 NOTE — INTERVAL H&P NOTE
The patient has been examined and the H&P has been reviewed:    I concur with the findings and no changes have occurred since H&P was written.    Procedure risks, benefits and alternative options discussed and understood by patient/family.    Dr. Raphael examined patient, discussed recommendations with patient, obtained informed consent, and answered all questions.     Proceed with Lap sleeve gastrectomy as scheduled.      Active Hospital Problems    Diagnosis  POA    *Morbid obesity with BMI of 40.0-44.9, adult [E66.01, Z68.41]  Not Applicable     Chronic      Resolved Hospital Problems   No resolved problems to display.

## 2023-05-22 NOTE — NURSING
Nurses Note -- 4 Eyes      5/22/2023   6:52 PM      Skin assessed during: Admit      [x] No Altered Skin Integrity Present    []Prevention Measures Documented      [] Yes- Altered Skin Integrity Present or Discovered   [] LDA Added if Not in Epic (Describe Wound)   [] New Altered Skin Integrity was Present on Admit and Documented in LDA   [] Wound Image Taken    Wound Care Consulted? No    Attending Nurse:  Kory Parrish RN     Second RN/Staff Member:  Татьяна Lima RN

## 2023-05-22 NOTE — ANESTHESIA PREPROCEDURE EVALUATION
05/22/2023  Jasmina Ro is a 47 y.o., female.    Jasmina Ro    Pre-op Diagnosis: Morbid obesity [E66.01]    Procedure(s): GASTRECTOMY, SLEEVE, LAPAROSCOPIC     Review of patient's allergies indicates:   Allergen Reactions    Norco [hydrocodone-acetaminophen] Itching       Current Outpatient Medications   Medication Instructions    atorvastatin (LIPITOR) 20 mg, Oral    cholecalciferol (vitamin D3) 5,000 Units, Oral    glipizide-metformin (METAGLIP) 2.5-250 mg per tablet No dose, route, or frequency recorded.    hydroCHLOROthiazide (HYDRODIURIL) 25 mg, Oral    lisinopriL 10 mg, Oral    montelukast (SINGULAIR) 10 mg, Oral    mv-ca-mn/iron/folic/K/herb 293 (ALIVE WOMEN'S ENERGY ORAL) No dose, route, or frequency recorded.    risankizumab-rzaa (SKYRIZI) 60 mg/mL Soln Intravenous, Every 28 days       TX LAP, GUEVARA RESTRICT PROC, LONGITUDINAL GASTRECTOMY [[id number]*    Past Medical History:   Diagnosis Date    ADD (attention deficit disorder)     Anxiety disorder, unspecified     Crohn disease     Depression     Diabetes mellitus     GERD (gastroesophageal reflux disease)     Hiatal hernia     HLD (hyperlipidemia)     Hypertension        Past Surgical History:   Procedure Laterality Date    COLONOSCOPY      COSMETIC SURGERY      breast reduction    ECTOPIC PREGNANCY SURGERY      ESOPHAGOGASTRODUODENOSCOPY      ESOPHAGOGASTRODUODENOSCOPY N/A 12/16/2022    Procedure: EGD w ОЛЬГА;  Surgeon: Venancio Raphael MD;  Location: [facility];  Service: General;  Laterality: N/A;    GALLBLADDER SURGERY      LAPAROSCOPIC CHOLECYSTECTOMY      LAPAROSCOPIC OOPHORECTOMY      MULTIPLE TOOTH EXTRACTIONS Bilateral     REDUCTION OF BOTH BREASTS      TONSILLECTOMY      WISDOM TOOTH EXTRACTION Bilateral      Lab Results   Component Value Date    WBC 5.71 05/09/2023    HGB 13.2 05/09/2023    HCT 40.0 05/09/2023     MCV 89.9 05/09/2023     (H) 05/09/2023   BMP  Lab Results   Component Value Date     05/09/2023    K 4.1 05/09/2023    CO2 26 05/09/2023    BUN 10.2 05/09/2023    CREATININE 0.75 05/09/2023    CALCIUM 9.5 05/09/2023 1/25/2023 NEGATIVE NUCLEAR STRESS TEST    Pre-op Assessment    I have reviewed the Patient Summary Reports.    I have reviewed the NPO Status.   I have reviewed the Medications.     Review of Systems  Anesthesia Hx:  No problems with previous Anesthesia  Denies Family Hx of Anesthesia complications.   Denies Personal Hx of Anesthesia complications.   Social:  Non-Smoker    Cardiovascular:   Exercise tolerance: good Hypertension  Denies Angina.  Denies Orthopnea.  Denies PND. hyperlipidemia  Denies MENEESS.  Functional Capacity good / => 4 METS    Hepatic/GI:   Hiatal Hernia, GERD  Bowel Conditions:  Inflammatory Bowel Disease, Crohns    Musculoskeletal:  Musculoskeletal Normal    Neurological:   Denies TIA. Denies CVA.    Endocrine:   Diabetes  Morbid Obesity / BMI > 40  Psych:   anxiety depression          Physical Exam  General: Well nourished, Alert and Oriented    Airway:  Mallampati: III   Mouth Opening: Normal  TM Distance: Normal  Tongue: Normal  Neck ROM: Normal ROM    Dental:  Intact    Chest/Lungs:  Clear to auscultation    Heart:  Rate: Normal  Rhythm: Regular Rhythm  No pretibial edema  No carotid bruits      Anesthesia Plan  Type of Anesthesia, risks & benefits discussed:    Anesthesia Type: Gen ETT  Intra-op Monitoring Plan: Standard ASA Monitors  Post Op Pain Control Plan: multimodal analgesia  Induction:  IV  Airway Plan: Direct, Post-Induction  Informed Consent: Informed consent signed with the Patient and all parties understand the risks and agree with anesthesia plan.  All questions answered. Patient consented to blood products? Yes  ASA Score: 3  Day of Surgery Review of History & Physical: H&P Update referred to the surgeon/provider.    Ready For Surgery From  Anesthesia Perspective.     .

## 2023-05-22 NOTE — BRIEF OP NOTE
[facility] - Periop Services  Brief Operative Note    SUMMARY     Surgery Date: 5/22/2023     Surgeon(s) and Role:     * Venancio Raphael MD - Primary     * Jose Orozco MD    Assisting Surgeon: Ernesto ADRIAN    Pre-op Diagnosis:  Morbid obesity [E66.01]    Post-op Diagnosis:  Post-Op Diagnosis Codes:     * Morbid obesity [E66.01]    Procedure(s) (LRB):  GASTRECTOMY, SLEEVE, LAPAROSCOPIC (N/A)    Anesthesia: General    Operative Findings: dictated per attending surgeon     Estimated Blood Loss: 20 mL    Estimated Blood Loss has been documented.         Specimens:   Specimen (24h ago, onward)       Start     Ordered    05/22/23 1324  Specimen to Pathology  RELEASE UPON ORDERING        References:    Click here for ordering Quick Tip   Question:  Release to patient  Answer:  Immediate    05/22/23 4387                    [id number]

## 2023-05-23 NOTE — OP NOTE
[facility] - 8th Floor Med Surg  Surgery Department  Operative Note    SUMMARY     Date of Procedure: 5/22/2023     Procedure: Procedure(s) (LRB):  GASTRECTOMY, SLEEVE, LAPAROSCOPIC (N/A)     Surgeon(s) and Role:     * Venancio Raphael MD - Primary     * Jose Orozco MD    Assisting Surgeon: None    Pre-Operative Diagnosis: Morbid obesity [E66.01]    Post-Operative Diagnosis: Post-Op Diagnosis Codes:     * Morbid obesity [E66.01]    Anesthesia: General    Operative Findings (including complications, if any):  Fatty liver    Description of Technical Procedures: Patient was taken to the operating room. After the induction of anesthesia the abdomen was prepped and draped in the usual sterile fashion. An optical trocar was used to access the peritoneum. Pneumoperitoneum was completed under direct vision. Additional working ports were placed under direct vision. A liver retractor was placed. The patient had no hiatal hernia defect.   The pylorus was identified and 4 cm proximal to the pylorus the greater curvature of the stomach was mobilized with Harmoinc scapel in the  direction of  the fundus and angle of His. The GE junction was identified the left parker was identified.  A 34F blunt-tip bougie was placed  in the stomach under direct vision towards the pylorus along the lesser curve Sequential firings of green and blue linear cutter stapler were used to create the antral pouch and the sleeve portion sleeve gastrectomy. Attention was paid to not encroach upon the angular incisura, or the GE junction. The staple line was intact and hemostatic. Fibrin glue product was used on the staple line the bougie was removed  the liver retractor was removed under direct vision specimen was removed fascia closed 0 Vicryl skin closed 4-0 Vicryl patient tolerated procedure well.      Estimated Blood Loss (EBL): 5 mL             Specimens:   Specimen (24h ago, onward)       Start     Ordered    05/22/23 1743  Specimen  to Pathology  RELEASE UPON ORDERING        References:    Click here for ordering Quick Tip   Question:  Release to patient  Answer:  Immediate    05/22/23 0291                            Condition: Good    Disposition: PACU - hemodynamically stable.    Attestation: I was present and scrubbed for the entire procedure.

## 2023-05-23 NOTE — DISCHARGE SUMMARY
[facility] - 8th Floor Med Surg  General Surgery  Discharge Summary      Patient Name: Jasmina Ro  MRN: [id number]  Admission Date: 5/22/2023  Hospital Length of Stay: 1 days  Discharge Date and Time:  05/23/2023 10:00 AM  Attending Physician: Venancio Raphael MD   Discharging Provider: KASHIF Saucedo  Primary Care Provider: Marija Amezquita NP     Admit Diagnosis:   Morbid Obesity    Discharge Diagnosis:   Morbid Obesity    Operations During Hospitalization: Laparoscopic Vertical Sleeve Gastrectomy 5/22/23 Dr. Venancio Raphael    Hospital Course: 47 yr old female admitted to [facility] for an elective bariatric procedure. Procedure performed as stated above. Upon completion of procedure, pt was transferred from the recovery room to the post surgical floor for further care and treatment. POD#1, afebrile, vital signs stable. The pt's diet was advanced to bariatric clear liquids.     Review of Systems: Mild incisional pain reported but tolerable. Some gas pain in chest and in between shoulder blades. No nausea, vomiting, dysphagia, GERD. Patient ambulating in the room/hallway and voiding without difficulty. Pt denies any dizziness, palpitations, SOB, or CP. All other review of systems are negative.     Physical Examination:   Vital signs: stable, noted in chart  General: Awake and alert, able to answer all questions. Resting in bed with family member at bedside  Respiratory:  Clear to auscultation bilaterally  Cardio: Regular rate and rhythm.  Abdomen: Soft, non distended. Bowel sounds present to all 4 quadrants. Lap sites clean, dry, and intact. Abdomen benign.   Neuro: Alert and oriented x's 4.    Labs:  Unremarkable, see chart    Discharge Medication:   Protonix  Zofran  Phenergan  Tylenol, OTC  Toradol  Lovenox     Condition: Satisfactory    Disposition:  To home.   -Pt to f/u with Dr. Raphael in two weeks  -Continue IS at home  -Walk 20min daily   -Continue bariatric clears throughout today  and progress dietary protocol as instructed by dietitian tomorrow upon waking up at home  -Begin Lovenox tomorrow  -Monitor CBGs and BP while at home and contact surgeons office or PCP if any issues     Consults:   Consults (From admission, onward)          Status Ordering Provider     Inpatient consult to Registered Dietitian/Nutritionist  Once        Provider:  (Not yet assigned)    Acknowledged STEVEN LENZ            Significant Diagnostic Studies: Labs: BMP:   Recent Labs   Lab 05/23/23  0448      K 3.7   CO2 20*   BUN 5.4*   CREATININE 0.61   CALCIUM 8.2*    and CBC   Recent Labs   Lab 05/23/23 0448   WBC 9.52   HGB 12.0   HCT 35.5*          Pending Diagnostic Studies:       Procedure Component Value Units Date/Time    Pregnancy, urine rapid [[id number]]     Order Status: Sent Lab Status: No result     Specimen: Urine     Specimen to Pathology [[id number]] Collected: 05/22/23 1324    Order Status: Sent Lab Status: In process Updated: 05/22/23 1706    Specimen: Tissue from Stomach           Final Active Diagnoses:    Diagnosis Date Noted POA    PRINCIPAL PROBLEM:  Morbid obesity with BMI of 40.0-44.9, adult [E66.01, Z68.41] 12/08/2022 Not Applicable     Chronic      Problems Resolved During this Admission:      Discharged Condition: good    Disposition: Home or Self Care    Follow Up:   Follow-up Information       Venancio Raphael MD Follow up in 2 week(s).    Specialty: General Surgery  Contact information:  [address] [address]  [phone]                           Patient Instructions:   No discharge procedures on file.  Medications:  Reconciled Home Medications:      Medication List        START taking these medications      acetaminophen 500 MG tablet  Commonly known as: TYLENOL  Take 2 tablets (1,000 mg total) by mouth every 8 (eight) hours. for 3 days     enoxaparin 40 mg/0.4 mL Syrg  Commonly known as: LOVENOX  Inject 0.4 mLs (40 mg total) into the skin  Daily. Start injections the day after hospital discharge, administer daily for 14 days  post op for 14 days  Start taking on: May 24, 2023     ketorolac 10 mg tablet  Commonly known as: TORADOL  Take 1 tablet (10 mg total) by mouth every 6 (six) hours. for 3 days     ondansetron 4 MG Tbdl  Commonly known as: ZOFRAN-ODT  Take 1 tablet (4 mg total) by mouth every 6 (six) hours as needed (FIRST LINE MED PRN NV).     pantoprazole 40 MG tablet  Commonly known as: PROTONIX  Take 1 tablet (40 mg total) by mouth once daily. TAKE ONCE DAILY FOR FIRST 30 DAYS POST OP     promethazine 12.5 MG Tab  Commonly known as: PHENERGAN  Take 1 tablet (12.5 mg total) by mouth every 6 (six) hours as needed (SECOND LINE MED PRN NV).            CONTINUE taking these medications      ALIVE WOMEN'S ENERGY ORAL     atorvastatin 20 MG tablet  Commonly known as: LIPITOR  Take 20 mg by mouth.     cholecalciferol (vitamin D3) 125 mcg (5,000 unit) Tab  Take 5,000 Units by mouth.     glipizide-metformin 2.5-250 mg per tablet  Commonly known as: METAGLIP     lisinopriL 10 MG tablet  Take 10 mg by mouth.     montelukast 10 mg tablet  Commonly known as: SINGULAIR  Take 10 mg by mouth.     SKYRIZI 60 mg/mL Soln  Generic drug: risankizumab-rzaa  Inject into the vein every 28 days.            STOP taking these medications      hydroCHLOROthiazide 25 MG tablet  Commonly known as: HYDRODIURIL          DC home once елена PO  FU 2 weeks  Instructed to start lovenox injections tomorrow at home due to family hx of blood clots  Discharge instructions given to patient and all questions answered.    Maddie Crespo, KASHIF  General Surgery  [facility] - 8th Floor Med Surg

## 2023-05-23 NOTE — CONSULTS
" [facility] - 8th Floor Med Surg  Adult Nutrition  Consult Note    SUMMARY     Recommendations    Recommendation/Intervention: Pt to continue nutritional monitoring and education in bariatric clinic.  Goals: Understanding of dietary guidelines and tolerance to diet.  Nutrition Goal Status: goal met  Communication of RD Recs: discussed on rounds    Assessment and Plan    No new Assessment & Plan notes have been filed under this hospital service since the last note was generated.  Service: Nutrition       Malnutrition Assessment                                       Reason for Assessment    Reason For Assessment: consult  Diagnosis: other (see comments) (s/p VSG)  Relevant Medical History: HTN, crohn's disease, DM, GERD, high cholesterol, morbid obesity, ulcerative colitis  Interdisciplinary Rounds: attended  General Information Comments: Pt was able to recite nutritional guidelines for home discharge  Nutrition Discharge Planning: Pt to remain on bariatric clear liquids through discharge and progress diet per protocol.    Nutrition Risk Screen    Nutrition Risk Screen: no indicators present    Nutrition/Diet History    Spiritual, Cultural Beliefs, Catholic Practices, Values that Affect Care: no  Factors Affecting Nutritional Intake: early satiety, clear liquid diet, decreased appetite (as expected)    Anthropometrics    Temp: 98.7 °F (37.1 °C)  Height Method: Stated  Height: 5' 8.5" (174 cm)  Height (inches): 68.5 in  Weight Method: Standard Scale  Weight: 127.9 kg (282 lb)  Weight (lb): 282 lb  Ideal Body Weight (IBW), Female: 142.5 lb  % Ideal Body Weight, Female (lb): 197.89 %  BMI (Calculated): 42.2  BMI Grade: greater than 40 - morbid obesity  Usual Body Weight (UBW), k kg  % Usual Body Weight: 98.6  % Weight Change From Usual Weight: -1.61 %       Lab/Procedures/Meds         Physical Findings/Assessment         Estimated/Assessed Needs    Weight Used For Calorie Calculations: 81 kg (178 " lb 9.2 oz)  Energy Calorie Requirements (kcal): 8126-6486 kcals/d  Energy Need Method: Kcal/kg  Protein Requirements: 81-90g/d  Weight Used For Protein Calculations: 81 kg (178 lb 9.2 oz)  Fluid Requirements (mL): 2558 ml/d  Estimated Fluid Requirement Method: other (see comments) (20 ml/kg actual BW/d)  RDA Method (mL): 1620         Nutrition Prescription Ordered    Current Diet Order: Bariatric clear liquids  Nutrition Order Comments: Pt to remain on current diet through discharge.    Evaluation of Received Nutrient/Fluid Intake    Energy Calories Required: not meeting needs (as expected)  Protein Required: not meeting needs (as expected)  Fluid Required: meeting needs  Tolerance: tolerating  % Intake of Estimated Energy Needs: 0 - 25 %  % Meal Intake: 0 - 25 %    Nutrition Risk    Level of Risk/Frequency of Follow-up: low       Monitor and Evaluation    Food and Nutrient Intake: energy intake, food and beverage intake  Food and Nutrient Adminstration: diet order  Knowledge/Beliefs/Attitudes: food and nutrition knowledge/skill  Anthropometric Measurements: weight, weight change, body mass index       Nutrition Follow-Up    RD Follow-up?: Yes (2w bariatric clinic follow up)

## 2023-05-23 NOTE — ANESTHESIA POSTPROCEDURE EVALUATION
Anesthesia Post Evaluation    Patient: Jasmina Ro    Procedure(s) Performed: Procedure(s) (LRB):  GASTRECTOMY, SLEEVE, LAPAROSCOPIC (N/A)    Final Anesthesia Type: general      Patient location during evaluation: PACU  Patient participation: Yes- Able to Participate  Level of consciousness: awake and alert  Post-procedure vital signs: reviewed and stable  Pain management: adequate  Airway patency: patent      Anesthetic complications: no      Cardiovascular status: hemodynamically stable  Respiratory status: unassisted  Hydration status: euvolemic  Follow-up not needed.          Vitals Value Taken Time   /75 05/22/23 1631   Temp 36.3 °C (97.3 °F) 05/22/23 1350   Pulse 98 05/22/23 1635   Resp 27 05/22/23 1635   SpO2 97 % 05/22/23 1635   Vitals shown include unvalidated device data.      Event Time   Out of Recovery 17:20:00         Pain/Belinda Score: Pain Rating Prior to Med Admin: 8 (5/23/2023  6:35 AM)  Pain Rating Post Med Admin: 0 (5/23/2023  7:05 AM)  Belinda Score: 10 (5/22/2023  5:26 PM)

## 2023-05-23 NOTE — PLAN OF CARE
Problem: Adult Inpatient Plan of Care  Goal: Plan of Care Review  Outcome: Ongoing, Progressing  Goal: Patient-Specific Goal (Individualized)  Outcome: Ongoing, Progressing  Goal: Absence of Hospital-Acquired Illness or Injury  Outcome: Ongoing, Progressing  Goal: Optimal Comfort and Wellbeing  Outcome: Ongoing, Progressing  Goal: Readiness for Transition of Care  Outcome: Ongoing, Progressing     Problem: Bariatric Environmental Safety  Goal: Safety Maintained with Care  Outcome: Ongoing, Progressing     Problem: Fall Injury Risk  Goal: Absence of Fall and Fall-Related Injury  Outcome: Ongoing, Progressing   PLAN OF CARE REVIEWED WITH PT AND PT'S SPOUSE.  PT'S SPOUSE AT BEDSIDE.  PT AA+OX4.  ABLE TO MAKE NEEDS KNOWN.  DOES NOT APPEAR TO BE IN ANY DISTRESS.  C/O PAIN.  PAIN MEDICATION GIVEN AS ORDERED.  REQUIRES STAND BY ASSIST WITH ADLS.  CONT. OF B/B.  IV FLUIDS INFUSING AS ORDERED.  PT REMAINS FREE FROM FALLS, INJURY, AND TRAUMA.  FALL PRECAUTIONS IN PLACE.  BED IN LOWEST POSITION WITH WHEELS LOCKED.  CALL LIGHT WITHIN REACH.  WILL CONTINUE TO MONITOR.

## 2023-05-23 NOTE — NURSING
Patient discharged home independently. Patient in stable condition. Postop instructions and follow up instructions given. Patient instructed to  new medications from preferred pharmacy. All questions answered.

## 2023-05-25 NOTE — PROGRESS NOTES
C3 nurse attempted to contact Jasmina Jayjay  for a TCC post hospital discharge follow up call. No answer. Left voicemail with callback information. Sent patient a message via patient's portal regarding follow up call.  The patient has a scheduled HOSFU appointment with Dr. aRphael on 06/05/2023 @ 145 pm.   The patient does not have a scheduled HOSFU appointment noted with Marija Amezquita NP within 5-7 days post discharge date 05/23/2023. Unable to route message to PCP staff.

## 2023-05-26 NOTE — PROGRESS NOTES
C3 nurse attempted to contact Jasmina Ro  for a TCC post hospital discharge follow up call. No answer. Left voicemail with callback information.  The patient has a scheduled HOSFU appointment with Dr. Raphael on 06/05/2023 @ 145 pm.   The patient does not have a scheduled HOSFU appointment noted with Marija Amezquita NP within 5-7 days post discharge date 05/23/2023. Unable to route message to PCP staff.

## 2023-05-30 NOTE — PROGRESS NOTES
C3 nurse spoke with Jasmina Ro   for a TCC post hospital discharge follow up call. The patient has a scheduled HOSFU appointment with The patient has a scheduled HOSFU appointment with Dr. Raphael on 06/05/2023 @ 145 pm.   The patient does not have a scheduled HOSFU appointment noted with Marija Amezquita NP within 5-7 days post discharge date 05/23/2023. Unable to route message to PCP staff.   .

## 2023-05-30 NOTE — TELEPHONE ENCOUNTER
Date call was completed:   Date and type of Surgery:     Are you drinking the recommended amount of water (73-100oz) a day? []  Yes        [x]  No  If not, how may ounces of water are you drinking? 52 oz (28 oz is Gatorade zero)    Are you drinking the recommended amount of protein a day?(recommendations base on individual goal) []  Yes        [x]  No  If not, how many grams of protein are you drinking?  57oz    Are you taking the recommended supplements that were discussed in pre-op class?  [x]  Yes   [] No  Multivitamin [x]  Yes        []  No  Calcium Supplement [x]  Yes        []  No  Iron [x]  Yes        []  No  Miralax []  Yes        [x]  No  take as needed     Are you walking at least 20 minutes a day for exercise? [x]  Yes   [] No    Have you resumed your home medications that you were instructed to resume? [x]  Yes   [] No    Do you have a follow-up appt scheduled with your family doctor or doctor treating you for you co-morb conditions within the week?          []  Yes   [x] No    Are you taking the Protonix (at night) that was prescribed to you in the hospital? [x]  Yes   [] No    Are you showering daily with an antibacterial soap?  [x]  Yes   [] No    Have you had a bowel movement since surgery? [x]  Yes   [] No    Notes: Advised patient to decrease Gatorade zero to about 8oz a day and to replace the remaining oz with water. Also advised patient to increase water intake to prevent dehydration or other complications. Pt did not have an appt with her PCP scheduled as she saw her PCP the Friday before surgery. Advised patient to take her blood sugar three times a day and blood pressure twice a day as medication doses may need to be adjusted within a week or so after surgery. She was educated on keeping a log and notifying her PCP if her blood sugar of blood pressure drops. Pt verbalized understanding.

## 2023-06-06 NOTE — PROGRESS NOTES
POST OP BARIATRIC NUTRITION FOLLOW UP    Type of surgery: sleeve gastrectomy   Post-op visit:   [x] 2 weeks  [] 4 weeks:  [] 2 months:  [] 4 months:  [] 6 months:  [] 9 months:  [] 1 year:   [] Other:     Weight: 266#       Post op complications:   [] Yes [x] No  If yes: [] Nausea   [] Vomiting   [] Constipation    [] Diarrhea    [] Other:     Dietary tolerance:  [x] Yes [] No [] Comment:     Adequate fluid intake:  [x] Yes [] No Approx. daily fluid intake: 70 fl oz/d  Adequate protein intake:  [x] Yes [] No  Approx. daily protein intake: 84g/d    Vitamins/Minerals:   [x] MVI:    [] Biotin:  [x] Calcium:    [] Hair/Nails:  [] B 50 complex:   [] Bariatric Specific MVI:  [] B12:    [] Bariatric Specific Calcium:  [x] Iron:    [] Other:   [] Non-compliance:        Labs:  not ordered for this visit   Comment:    Expected compliance:  [x]Good  []Fair  []Poor      Progress:   [x]Patient progressing well at this time with no complaints   [] Patient expressed complaint at this time: See additional notes       Bariatric Diet Education:   Verbalizes understanding Demonstrates  Needs further teaching Needs practice/ supervision Comments    Bariatric Clear [] [] [] []    Bariatric Full [] [] [] []    Bariatric Puree [x] [] [] []    Bariatric Soft [x] [] [] []    Bariatric Regular [] [] [] []    Bariatric Reintroduction of Starchy CHO [] [] [] []    Bariatric: Mindful Eating [] [] [] []    Importance of Protein and Vitamin Protocol [] [] [] []    Other:            Additional Notes:

## 2023-07-24 NOTE — PROGRESS NOTES
A 2 month F/U post gastric sleeve surgery was conducted with patient. Her current weight is 256.2 lbs. A body composition and measurements were conducted.  Patient was educated on results. She lost 31.5 lbs. And 12 inches. She is walking for exercise.    PLAN:  - Patient will continue with cardio 3-5x/week and strength 2x/week.  - Patient will follow dietary advice from Cielo Hammond RD.      It is my professional opinion that patient is in the preparation stage of behavior change.    PHOENIX Karimi, CPT, CHC

## 2023-07-24 NOTE — PROGRESS NOTES
POST OP BARIATRIC NUTRITION FOLLOW UP    Type of surgery: sleeve gastrectomy   Post-op visit:   [] 2 weeks  [] 4 weeks:  [x] 2 months:  [] 4 months:  [] 6 months:  [] 9 months:  [] 1 year:   [] Other:     Weight: 256.4lb       Post op complications:   [] Yes [x] No  If yes: [] Nausea   [] Vomiting   [] Constipation    [] Diarrhea    [] Other:     Dietary tolerance:  [x] Yes [] No [] Comment:     Adequate fluid intake:  [x] Yes [] No Approx. daily fluid intake:   Adequate protein intake:  [x] Yes [] No  Approx. daily protein intake:  100-114g     Vitamins/Minerals:   [x] MVI:    [] Biotin:  [x] Calcium:    [] Hair/Nails:  [] B 50 complex:   [] Bariatric Specific MVI:  [] B12:    [] Bariatric Specific Calcium:  [x] Iron:    [] Other:   [] Non-compliance:        Labs:     Comment:    Expected compliance:  [x]Good  []Fair  []Poor      Progress:   [x]Patient progressing well at this time with no complaints   [] Patient expressed complaint at this time: See additional notes       Bariatric Diet Education:   Verbalizes understanding Demonstrates  Needs further teaching Needs practice/ supervision Comments    Bariatric Clear [] [] [] []    Bariatric Full [] [] [] []    Bariatric Puree [] [] [] []    Bariatric Soft [] [] [] []    Bariatric Regular [x] [] [] []    Bariatric Reintroduction of Starchy CHO [] [] [] []    Bariatric: Mindful Eating [] [] [] []    Importance of Protein and Vitamin Protocol [] [] [] []    Other:            Additional Notes:     Pt understands diet progression with no complaints   Protein shake 42g   2oz protein  and vegetables   Protein shake 1/2  42g   2oz protein   1/2 protein shakes

## 2025-01-04 NOTE — DISCHARGE INSTRUCTIONS
HOSPITAL DISCHARGE INSTRUCTIONS    Clinic Phone Numbers       Surgeons office number and after hours on call surgeon: [phone].    ALWAYS call the surgeons office PRIOR to going to an Urgent Care or the emergency room. If medical emergency, call 911.     Signs and Symptoms that would warrant a phone call of the office (regardless of the time of day):     Fever greater than 101 F     Uncontrolled pain that does not improve with pain medication     Uncontrolled nausea that does not improve with nausea medication      Vomiting     Shortness of breath     Chest Pain     Foul smelling drainage from incision and/or yellow or green drainage from incision     Red, hot painful incisions     Bloody bowel movements     ** If you feel as though it is a life-threatening emergency, call 911 and go to the emergency room**          Prescriptions     Medications     Pain medication (if needed) Tylenol over the counter is safe to take for discomfort     Anti-nausea medication (if needed)     Proton Pump Inhibitor (finish all 30 days), call [phone] if you did not receive 30 days of medication       Supplements     Chewable multivitamin- take 2 times a day (unless otherwise directed)     Chewable Calcium Citrate with D3- take 3 times a day (unless otherwise directed)     Iron tablet- take once daily      MiraLAX- take once daily for 2 weeks       Home Medications     Please review your medication list that you received at pre-op class as well as at the hospital instructions upon discharge to assure you are resuming all medications that are deemed  safe after surgery.      ** REMINDER- You should have scheduled your follow-up with your prescribing doctor for 1-week post-op**          Appointments     Surgeons Post-op Visits- please review orange sheet you received in the mail after surgery. Call [phone] if you need to reschedule your surgeons post-op visit.      Bariatric Team Post-op Visits- please review blue sheet  you received in your e-mail or please reference [facility] for your post-op appointments. . Call [phone] option 1 if you need to reschedule your bariatric team post-op visit.          Nutritional Considerations     Hydration is CRITICAL!     Daily fluid intake of  oz water     Water is more important than protein      Review list of allowable and non-allowable liquids in your Bariatric Booklet    The only fluids that count towards your water goal is water, sugar free, caffeine free flavored water        Diet progression          Continue the dietary protocol until you meet with the dietitian at your 2-week visit     Strive to reach protein goal. Only liquids counted toward your protein goal are protein shake, milk and approved yogurt     It is MANDATORY that you do not progress your diet without speaking to the dietitian to prevent potential complications          Incisional Care     Wash your hands before you touch your incisions or dressings     Remove any gauze or dressing over your incision. ONLY steri-strips (butter-fly band aids) should remain over your incision     Shower daily with an anti-bacterial soap (Hibiclens or Dial, orange bar).      Allow water to hit your back in the shower     Wet the incisions with water     Apply soap to a clean washcloth and wash over your incisions (do not scrub)     Rinse your incision with water and pat dry with a clean towel      Check your incisions daily for any redness, swelling, hot to touch, or bright red, green or yellow drainage.             Dark red, dried blood, indention of an incision, bruising may appear under the steri-strips. This is normal.     Do not apply any creams, ointments, etc. on the incisions.      Leave incision open to air (unless instructed otherwise)          Activity     Walk and/or ride a stationary bike 20 minutes a day     Do not lift, pull or push anything greater than 10 pounds for 4-6 weeks     Do not go the gym until you are  4-6 weeks post-op     Use your incentive spirometer (breathing machine) 10 x an hour for 1-2 weeks     Shower daily, DO NOT submerge yourself in water until 2 weeks post-op and cleared by surgeon          Post-Operative Expectations     A soreness is to be expected. Pain differs for everyone. Please refer to the pain scale and list of when to call the doctor regarding pain.     Nausea can last a few weeks after surgery due to the body getting adjusted to the new small stomach. Take anti-nausea as needed and stay HYDRATED. Slight dehydration will cause nausea.     Constipation is common after surgery. Take MiraLAX daily even if your bowel movements are regular. Please refer to the FAQs regarding constipation. Water is essential in preventing constipation.         Constipation after Bariatric Surgery  The Basics     ***Due to the change in your diet just prior to surgery and immediately after surgery, it is very common to have a change in your bowel movements in the immediate post op period. It is completely normal to not have a bowel movement everyday in the first few weeks after bariatric surgery due to decreased oral intake. It is common not to have your first bowel movement for 4-5 days after surgery. If you don't have your first bowel movement 7 days after surgery, please contact surgeon's office to discuss.     What is constipation? -- Constipation is a common problem that makes it hard to have bowel movements. Your bowel movements might be:  Too hard  Too small  Hard to get out  Happening fewer than 3 times a week  What causes constipation after bariatric surgery? -- Constipation can be caused by:  High protein diet and decrease in water intake after bariatric surgery  What other symptoms should I watch for? -- These symptoms could signal a more serious problem:  Blood in the toilet or on the toilet paper after having a bowel movement  Fever  Feeling weak  It could also be a sign of a problem if you have new  constipation without a change in your medicines or diet, and have never had constipation in the past.   Is there anything I can do on my own to get rid of constipation? -- Yes. Try these steps:  Begin your MiraLAX the first day at home and continue it everyday if you are having normal soft bowel movement, even after the two weeks.   Please call the office [phone] if you do not have a bowel movement within 5 days of surgery.   Okay to take over the counter stool softeners once to twice per day, in addition to daily Miralax, if needed to help with post op constipation.   If you begin to have mutliple loose bowel movements (more than 3 per day), discontinue the stool softeners and Miralax.   If you continue to have multiple loose bowel movements per day (more than 5 loose bowel movements per day for more than 2 days in a row) after you have quit taking stool softeners and Miralax, contact surgeon's office to discuss this.  [phone]    Yes

## (undated) DEVICE — SOL IRRI STRL WATER 1000ML

## (undated) DEVICE — KIT GEN LAPAROSCOPY LAFAYETTE

## (undated) DEVICE — STAPLER ECHELON FLEX 60MM 44CM

## (undated) DEVICE — COVER MAYO STAND REINFRCD 30

## (undated) DEVICE — CONTAINER SPECIMEN SCREW 4OZ

## (undated) DEVICE — TROCAR LAPSCP XCEL 12MM 10CM

## (undated) DEVICE — PAD PREP 50/CA

## (undated) DEVICE — SUT VICRYL+ 27 UR-6 VIOL

## (undated) DEVICE — IRRIGATOR HYDRO-SURG PLUS 5MM

## (undated) DEVICE — SCISSOR CURVED ENDOPATH 5MM

## (undated) DEVICE — CHLORAPREP W TINT 26ML APPL

## (undated) DEVICE — GLOVE PROTEXIS HYDROGEL SZ7.5

## (undated) DEVICE — HOLDER STRIP-T SELF ADH 2X10IN

## (undated) DEVICE — NDL HYPO 22GX1 1/2 SYR 10ML LL

## (undated) DEVICE — MATTRESS HOVERMAT SPLIT TRNSF

## (undated) DEVICE — BINDER ABD 12IN LG 63-74IN

## (undated) DEVICE — CANNULA ENDOPATH XCEL 5X100MM

## (undated) DEVICE — SHEARS HS LONG 5MM CURVED

## (undated) DEVICE — RELOAD ECHELON FLEX BLU 60MM

## (undated) DEVICE — POSITIONER HEEL FOAM CONVOLTD

## (undated) DEVICE — TROCAR ENDOPATH XCEL 11MM 10CM

## (undated) DEVICE — GLOVE PROTEXIS LTX MICRO  7.5

## (undated) DEVICE — CLIP ENDO LIGATION LARGE CLIPS

## (undated) DEVICE — TROCAR ENDOPATH XCEL 5X100MM

## (undated) DEVICE — TROCAR ENDOPATH XCEL 15MM 10CM

## (undated) DEVICE — CUSHION  WC FOAM 20X20X.75IN

## (undated) DEVICE — APPLICATOR VISTASEAL RIG 35CM

## (undated) DEVICE — NDL GRANEE OPEN LOOP GRASPER

## (undated) DEVICE — RELOAD ECHELON FLEX GRN 60MM

## (undated) DEVICE — SUT VICRYL PLUS 4-0 FS-2 27IN

## (undated) DEVICE — CLOSURE SKIN STERI STRIP 1/2X4

## (undated) DEVICE — KIT SURGICAL TURNOVER

## (undated) DEVICE — SEALANT VISTASEAL FIBRIN 10ML

## (undated) DEVICE — ELECTRODE PATIENT RETURN DISP

## (undated) DEVICE — DRAPE SLUSH WARMER 66X44IN

## (undated) DEVICE — APPLICATOR CHLORAPREP ORN 26ML